# Patient Record
Sex: MALE | URBAN - METROPOLITAN AREA
[De-identification: names, ages, dates, MRNs, and addresses within clinical notes are randomized per-mention and may not be internally consistent; named-entity substitution may affect disease eponyms.]

---

## 2021-12-21 ENCOUNTER — OFFICE VISIT (OUTPATIENT)
Dept: URBAN - METROPOLITAN AREA CLINIC 22 | Facility: CLINIC | Age: 31
End: 2021-12-21
Payer: OTHER GOVERNMENT

## 2021-12-21 DIAGNOSIS — H52.7 DISORDER OF REFRACTION: ICD-10-CM

## 2021-12-21 DIAGNOSIS — I10 HYPERTENSION: Primary | ICD-10-CM

## 2021-12-21 DIAGNOSIS — H11.153 PINGUECULA, BILATERAL: ICD-10-CM

## 2021-12-21 PROCEDURE — 92004 COMPRE OPH EXAM NEW PT 1/>: CPT | Performed by: STUDENT IN AN ORGANIZED HEALTH CARE EDUCATION/TRAINING PROGRAM

## 2021-12-21 ASSESSMENT — INTRAOCULAR PRESSURE
OS: 17
OD: 16

## 2021-12-21 NOTE — IMPRESSION/PLAN
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 Plan: Refractive error accounts for patient's complaints. Recommend new glasses Rx. none

## 2021-12-21 NOTE — IMPRESSION/PLAN
Impression: Hypertension: I10. Plan: Discussed findings, no retinopathy OU. Patient educated on importance of well-controlled blood pressure and dilated eye exams. Continue management with PCP.

## 2023-02-13 NOTE — IMPRESSION/PLAN
Impression: Pinguecula, bilateral: H11.153. Plan: Discussed findings. Rx artificial tears BID-QID OU and UV protection outdoors. Needs appointment with CXR